# Patient Record
Sex: MALE | Race: WHITE | NOT HISPANIC OR LATINO | Employment: OTHER | URBAN - NONMETROPOLITAN AREA
[De-identification: names, ages, dates, MRNs, and addresses within clinical notes are randomized per-mention and may not be internally consistent; named-entity substitution may affect disease eponyms.]

---

## 2023-09-12 ENCOUNTER — OFFICE VISIT (OUTPATIENT)
Dept: URGENT CARE | Facility: CLINIC | Age: 23
End: 2023-09-12

## 2023-09-12 ENCOUNTER — APPOINTMENT (OUTPATIENT)
Dept: RADIOLOGY | Facility: CLINIC | Age: 23
End: 2023-09-12

## 2023-09-12 VITALS
DIASTOLIC BLOOD PRESSURE: 60 MMHG | TEMPERATURE: 97.8 F | HEART RATE: 78 BPM | RESPIRATION RATE: 16 BRPM | OXYGEN SATURATION: 98 % | SYSTOLIC BLOOD PRESSURE: 120 MMHG

## 2023-09-12 DIAGNOSIS — S93.401A SPRAIN OF RIGHT ANKLE, UNSPECIFIED LIGAMENT, INITIAL ENCOUNTER: ICD-10-CM

## 2023-09-12 DIAGNOSIS — M79.671 RIGHT FOOT PAIN: Primary | ICD-10-CM

## 2023-09-12 DIAGNOSIS — M79.671 RIGHT FOOT PAIN: ICD-10-CM

## 2023-09-12 PROCEDURE — G0382 LEV 3 HOSP TYPE B ED VISIT: HCPCS

## 2023-09-12 PROCEDURE — 73630 X-RAY EXAM OF FOOT: CPT

## 2023-09-12 PROCEDURE — 73610 X-RAY EXAM OF ANKLE: CPT

## 2023-09-12 NOTE — PATIENT INSTRUCTIONS
You may take over the counter Tylenol (Acetaminophen) and/or Motrin (Ibuprofen) as needed, as directed on packaging. Be sure to get plenty of rest, and drinking fluids to remain hydrated. If you continue to have symptoms please follow-up with your primary care provider and orthopedics as necessary. Your xray was read by the provider you saw today in the office as a preliminary result. Your xray will be reviewed and an official reading will be provided by a Radiologist. If you have access to My Chart you can see these results there. Also if there is any significant findings you will be contacted with those results.

## 2023-09-12 NOTE — PROGRESS NOTES
[facility]        NAME: Sergio Nguyen is a 23 y.o. male  : 2000    MRN: [id number]  DATE: 2023  TIME: 8:00 PM    Assessment and Plan   Right foot pain [M79.671]  1. Right foot pain  XR foot 3+ vw right    XR ankle 3+ vw right      2. Sprain of right ankle, unspecified ligament, initial encounter              Patient Instructions       Follow up with PCP in 3-5 days. Proceed to  ER if symptoms worsen. Chief Complaint     Chief Complaint   Patient presents with   • Foot Pain   • Ankle Pain     Started 7 days ago  Right foot caught on piece of wood under deck  Right foot, and ankle pain  Pain 10/10         History of Present Illness       Times approximately 1 week ago patient was standing on a 2 x 4 on the deck and got his foot caught on a piece of wood under the deck. He has right foot and ankle discomfort. He has been taking ibuprofen as needed. He has continued to work but states he has difficulty weightbearing on the right heel has been walking around toe walking. He does wear boots. No significant swelling no overlying skin changes noted      Review of Systems   Review of Systems   Constitutional: Negative for chills, fatigue and fever. Respiratory: Negative for cough and shortness of breath. Cardiovascular: Negative for chest pain and palpitations. Musculoskeletal: Positive for arthralgias and gait problem. Negative for joint swelling and myalgias. Skin: Negative for color change, pallor, rash and wound. Neurological: Negative for dizziness, light-headedness and headaches. Current Medications     No current outpatient medications on file.     Current Allergies     Allergies as of 2023 - Reviewed 2023   Allergen Reaction Noted   • Lactose intolerance (gi) - food allergy Diarrhea 2015   • Morphine and related Other (See Comments) 2010   • Omeprazole Other (See Comments) 2012            The following portions of the patient's history were reviewed and updated as appropriate: allergies, current medications, past family history, past medical history, past social history, past surgical history and problem list.     History reviewed. No pertinent past medical history. History reviewed. No pertinent surgical history. History reviewed. No pertinent family history. Medications have been verified. Objective   /60   Pulse 78   Temp 97.8 °F (36.6 °C)   Resp 16   SpO2 98%        Physical Exam     Physical Exam  Vitals and nursing note reviewed. Constitutional:       General: He is not in acute distress. Appearance: Normal appearance. He is normal weight. He is not ill-appearing. HENT:      Head: Normocephalic and atraumatic. Cardiovascular:      Rate and Rhythm: Normal rate. Pulses: Normal pulses. Dorsalis pedis pulses are 2+ on the right side. Pulmonary:      Effort: Pulmonary effort is normal.   Musculoskeletal:         General: Tenderness and signs of injury present. No swelling or deformity. Normal range of motion. Cervical back: Normal range of motion and neck supple. Right ankle: Tenderness present over the lateral malleolus and medial malleolus. Normal range of motion. Right Achilles Tendon: Tenderness present. Right foot: Normal range of motion and normal capillary refill. Tenderness present. No swelling, foot drop, prominent metatarsal heads or laceration. Normal pulse. Left foot: Normal.   Feet:      Right foot:      Skin integrity: Skin integrity normal. No skin breakdown, erythema, warmth or callus. Comments: Provider Radiology Interpretation (preliminary)   Final results will be as per official Radiology Report when available:   No acute fracture or dislocation noted  Skin:     General: Skin is warm and dry. Capillary Refill: Capillary refill takes less than 2 seconds. Neurological:      General: No focal deficit present.       Mental Status: He is alert and oriented to person, place, and time.    Psychiatric:         Mood and Affect: Mood normal.         Behavior: Behavior normal.

## 2025-03-27 ENCOUNTER — OFFICE VISIT (OUTPATIENT)
Dept: URGENT CARE | Facility: CLINIC | Age: 25
End: 2025-03-27

## 2025-03-27 VITALS
OXYGEN SATURATION: 98 % | RESPIRATION RATE: 18 BRPM | HEART RATE: 55 BPM | TEMPERATURE: 98.7 F | DIASTOLIC BLOOD PRESSURE: 80 MMHG | SYSTOLIC BLOOD PRESSURE: 132 MMHG

## 2025-03-27 DIAGNOSIS — Z23 ENCOUNTER FOR IMMUNIZATION: ICD-10-CM

## 2025-03-27 DIAGNOSIS — S61.412A LACERATION OF SKIN OF LEFT HAND, INITIAL ENCOUNTER: Primary | ICD-10-CM

## 2025-03-27 PROCEDURE — 90715 TDAP VACCINE 7 YRS/> IM: CPT

## 2025-03-27 PROCEDURE — 90471 IMMUNIZATION ADMIN: CPT

## 2025-03-27 PROCEDURE — 99213 OFFICE O/P EST LOW 20 MIN: CPT

## 2025-03-27 RX ORDER — LIDOCAINE HYDROCHLORIDE 10 MG/ML
5 INJECTION, SOLUTION EPIDURAL; INFILTRATION; INTRACAUDAL; PERINEURAL ONCE
Status: COMPLETED | OUTPATIENT
Start: 2025-03-27 | End: 2025-03-27

## 2025-03-27 RX ADMIN — LIDOCAINE HYDROCHLORIDE 5 ML: 10 INJECTION, SOLUTION EPIDURAL; INFILTRATION; INTRACAUDAL; PERINEURAL at 19:24

## 2025-03-27 NOTE — PATIENT INSTRUCTIONS
Watch the wound for increased redness, swelling, warmth, drainage or pain. If this occurs you will need to be rechecked     Motrin and tylenol for pain according to package directions    Keep the wound clean and dry.     Suture removal in 10-14 days from placement (4/6-4/10).   
Statement Selected

## 2025-03-27 NOTE — PROGRESS NOTES
"  [facility]        NAME: Sandeep Zamora is a 24 y.o. male  : 2000    MRN: [id number]  DATE: 2025  TIME: 7:16 PM    Assessment and Plan   Laceration of skin of left hand, initial encounter [S61.412A]  1. Laceration of skin of left hand, initial encounter  Tdap Vaccine greater than or equal to 8yo    lidocaine (PF) (XYLOCAINE-MPF) 1 % injection 5 mL      2. Encounter for immunization  Tdap Vaccine greater than or equal to 8yo        Laceration to the left hand when using a power tool that kicked back around 1:30 PM today. Pt was working and rinsed it out \"real good\" and placed liquid band aid over the wound.  Unknown last tetanus immunization.     Patient Instructions       Follow up with PCP in 3-5 days.  Proceed to  ER if symptoms worsen.    If tests are performed, our office will contact you with results only if changes need to made to the care plan discussed with you at the visit. [facility]    Chief Complaint     Chief Complaint   Patient presents with    Hand Laceration     Cut hand on tool at work at 130 today.  Washed with soap and water.  Did apply liquid bandage.  Will not stop bleeding         History of Present Illness       He was working with a power tool earlier today which kicked back and sliced his left hand.  He washed the wound out real good and placed liquid Band-Aid over the area.  He reported to this facility as the area will not stop bleeding.  Last tetanus .      Review of Systems   Review of Systems   Skin:  Positive for wound.         Current Medications     No current outpatient medications on file.    Current Facility-Administered Medications:     lidocaine (PF) (XYLOCAINE-MPF) 1 % injection 5 mL, 5 mL, Infiltration, Once,     Current Allergies     Allergies as of 2025 - Reviewed 2025   Allergen Reaction Noted    Lactose intolerance (gi) - food allergy Diarrhea 2015    Morphine and codeine " Other (See Comments) 09/22/2010    Omeprazole Other (See Comments) 07/09/2012            The following portions of the patient's history were reviewed and updated as appropriate: allergies, current medications, past family history, past medical history, past social history, past surgical history and problem list.     History reviewed. No pertinent past medical history.    History reviewed. No pertinent surgical history.    History reviewed. No pertinent family history.      Medications have been verified.        Objective   Pulse 55   Temp 98.7 °F (37.1 °C) (Skin)   Resp 18   SpO2 98%        Physical Exam     Physical Exam  Vitals and nursing note reviewed.   Constitutional:       General: He is not in acute distress.     Appearance: Normal appearance. He is normal weight. He is not ill-appearing.   HENT:      Head: Normocephalic and atraumatic.   Cardiovascular:      Rate and Rhythm: Regular rhythm. Bradycardia present.      Pulses: Normal pulses.      Heart sounds: Normal heart sounds.   Pulmonary:      Effort: Pulmonary effort is normal.      Breath sounds: Normal breath sounds.   Musculoskeletal:         General: Signs of injury present.        Hands:       Cervical back: Normal range of motion and neck supple.      Comments: Small laceration (superficial), 0.5cm, linear. Minimal bleeding controlled with manual pressure. Cap refill less than 2 secs. Sensation intact. Distal pulses palpable. ROM and strength WNL.   Skin:     General: Skin is warm and dry.      Capillary Refill: Capillary refill takes less than 2 seconds.   Neurological:      General: No focal deficit present.      Mental Status: He is alert and oriented to person, place, and time.

## 2025-05-26 ENCOUNTER — OFFICE VISIT (OUTPATIENT)
Dept: URGENT CARE | Facility: CLINIC | Age: 25
End: 2025-05-26
Payer: COMMERCIAL

## 2025-05-26 ENCOUNTER — APPOINTMENT (OUTPATIENT)
Dept: RADIOLOGY | Facility: CLINIC | Age: 25
End: 2025-05-26
Attending: PHYSICIAN ASSISTANT
Payer: COMMERCIAL

## 2025-05-26 VITALS
HEART RATE: 80 BPM | DIASTOLIC BLOOD PRESSURE: 98 MMHG | OXYGEN SATURATION: 98 % | SYSTOLIC BLOOD PRESSURE: 151 MMHG | TEMPERATURE: 98 F | RESPIRATION RATE: 18 BRPM

## 2025-05-26 DIAGNOSIS — M79.641 HAND PAIN, RIGHT: ICD-10-CM

## 2025-05-26 DIAGNOSIS — S62.314A CLOSED DISPLACED FRACTURE OF BASE OF FOURTH METACARPAL BONE OF RIGHT HAND, INITIAL ENCOUNTER: Primary | ICD-10-CM

## 2025-05-26 PROCEDURE — 73130 X-RAY EXAM OF HAND: CPT

## 2025-05-26 PROCEDURE — 99214 OFFICE O/P EST MOD 30 MIN: CPT | Performed by: PHYSICIAN ASSISTANT

## 2025-05-26 RX ORDER — IBUPROFEN 800 MG/1
800 TABLET, FILM COATED ORAL EVERY 6 HOURS PRN
Qty: 30 TABLET | Refills: 0 | Status: SHIPPED | OUTPATIENT
Start: 2025-05-26

## 2025-05-26 NOTE — PATIENT INSTRUCTIONS
Your xray was read by the provider you saw today in the office as a preliminary result. Your xray will be reviewed and an official reading will be provided by a Radiologist. If you have access to My Chart you can see these results there. Also if there is any significant findings you will be contacted with those results.    You have been placed in a splint today because you have a fracture or suspected fracture. It is important to leave you splint in place until you follow-up with an orthopedic specialist. The splint helps protect your fracture and keep it from moving and prevents you from developing nerve and vessel damage.   Follow-up with ortho sometime in the next week.   Take 600 to 800 mg of ibuprofen every 8 hours.  Supplement with Tylenol 1000 mg every 8 hours as needed, alternating every 4 hours with ibuprofen.  Ice 20 minutes on 20 minutes off.  Elevate above the level of the heart whenever not in use.  If you develop numbness or tingling in your fingers or have increased swelling or pain report to the closest emergency room, loosen the wrap around your splint. If symptoms do not improve in the next 20-30 minutes after loosening the wrap, report to the emergency department.       Follow up with PCP in 3-5 days.  Proceed to  ER if symptoms worsen.    If tests have been performed at [facility], our office will contact you with results if changes need to be made to the care plan discussed with you at the visit.  You can review your full results on [facility] MyChart.

## 2025-05-26 NOTE — PROGRESS NOTES
[facility]        NAME: Sandeep Zamora is a 24 y.o. male  : 2000    MRN: [id number]  DATE: May 26, 2025  TIME: 3:28 PM    Assessment and Plan   Closed displaced fracture of base of fourth metacarpal bone of right hand, initial encounter [S62.314A]  1. Closed displaced fracture of base of fourth metacarpal bone of right hand, initial encounter  Ambulatory Referral to Orthopedic Surgery    ibuprofen (MOTRIN) 800 mg tablet      2. Hand pain, right  XR hand 3+ vw right      X-ray SHEMAR initial interpretation:Closed displaced fracture of base of fourth metacarpal bone of right hand.  The patient was splinted using Ortho-Glass in an ulnar gutter configuration for immobilization.  A referral to orthopedics was provided for further evaluation and management.  The patient was instructed to contact the orthopedic clinic to schedule an appointment.  As patient ask for the final results review.  I did mention one of the provider who works tomorrow will call with the results.    Patient Instructions   Your xray was read by the provider you saw today in the office as a preliminary result. Your xray will be reviewed and an official reading will be provided by a Radiologist. If you have access to My Chart you can see these results there. Also if there is any significant findings you will be contacted with those results.    You have been placed in a splint today because you have a fracture or suspected fracture. It is important to leave you splint in place until you follow-up with an orthopedic specialist. The splint helps protect your fracture and keep it from moving and prevents you from developing nerve and vessel damage.   Follow-up with ortho sometime in the next week.   Take 600 to 800 mg of ibuprofen every 8 hours.  Supplement with Tylenol 1000 mg every 8 hours as needed, alternating every 4 hours with ibuprofen.  Ice 20 minutes on 20 minutes off.  Elevate above the level of the heart whenever not in  use.  If you develop numbness or tingling in your fingers or have increased swelling or pain report to the closest emergency room, loosen the wrap around your splint. If symptoms do not improve in the next 20-30 minutes after loosening the wrap, report to the emergency department.       Follow up with PCP in 3-5 days.  Proceed to  ER if symptoms worsen.    If tests have been performed at [facility], our office will contact you with results if changes need to be made to the care plan discussed with you at the visit.  You can review your full results on [facility].    Chief Complaint     Chief Complaint   Patient presents with    Pain     Pain over right 4th 5th  punched a wall last night swelling noted with abrasion to 4th knuckle denies other injuries last td 1 month ago          History of Present Illness       Injury  The incident occurred 12 to 24 hours ago. The incident occurred at home. The injury mechanism was a direct blow (punched against the wall). No protective equipment was used. Finger injury location: 4 th and 5 th finger. The pain is moderate. It is unlikely that a foreign body is present. Pertinent negatives include no headaches, inability to bear weight, light-headedness, nausea, numbness, seizures, tingling, visual disturbance, vomiting or weakness. There have been no prior injuries to these areas. His tetanus status is UTD.       Review of Systems   Review of Systems   Constitutional:  Negative for activity change and fatigue.   HENT:  Negative for ear discharge and rhinorrhea.    Eyes:  Negative for photophobia and visual disturbance.   Gastrointestinal:  Negative for diarrhea, nausea and vomiting.   Musculoskeletal:  Positive for joint swelling. Negative for myalgias.   Skin:  Positive for color change and wound. Negative for rash.   Neurological:  Negative for dizziness, tingling, seizures, syncope, facial asymmetry, speech difficulty, weakness, light-headedness, numbness and headaches.    Psychiatric/Behavioral:  Negative for confusion.          Current Medications     Current Medications[1]    Current Allergies     Allergies as of 05/26/2025 - Reviewed 05/26/2025   Allergen Reaction Noted    Lactose intolerance (gi) - food allergy Diarrhea 06/18/2015    Morphine and codeine Other (See Comments) 09/22/2010    Omeprazole Other (See Comments) 07/09/2012            The following portions of the patient's history were reviewed and updated as appropriate: allergies, current medications, past family history, past medical history, past social history, past surgical history and problem list.     Past Medical History[2]    Past Surgical History[3]    Family History[4]      Medications have been verified.        Objective   /98   Pulse 80   Temp 98 °F (36.7 °C)   Resp 18   SpO2 98%   No LMP for male patient.       Physical Exam     Physical Exam  Vitals and nursing note reviewed.   Constitutional:       Appearance: Normal appearance.     Cardiovascular:      Rate and Rhythm: Normal rate and regular rhythm.      Pulses: Normal pulses.      Heart sounds: Normal heart sounds.   Pulmonary:      Effort: Pulmonary effort is normal.      Breath sounds: Normal breath sounds.   Abdominal:      General: Abdomen is flat.      Palpations: Abdomen is soft.     Musculoskeletal:         General: Tenderness present. No swelling, deformity or signs of injury.      Right hand: No tenderness or bony tenderness. Normal range of motion. Normal capillary refill. Normal pulse.      Left hand: Swelling and tenderness present. Decreased range of motion.      Right lower leg: No edema.      Left lower leg: No edema.      Right ankle: No swelling, deformity or ecchymosis. No tenderness. Normal range of motion.      Right Achilles Tendon: Normal.      Left ankle: No swelling, deformity or ecchymosis. No tenderness. Normal range of motion.      Left Achilles Tendon: Normal.     Skin:     General: Skin is warm.      Capillary  Refill: Capillary refill takes less than 2 seconds.      Findings: Abrasion and bruising present. No erythema.     Neurological:      Mental Status: He is alert.           Orthopedic injury treatment    Date/Time: 5/26/2025 11:15 AM    Performed by: Lilibeth Caldwell PA-C  Authorized by: Lilibeth Caldwell PA-C    Patient Location:  [facility] Protocol:  procedure performed by consultantConsent: Verbal consent obtained  Consent given by: patient  Patient understanding: patient states understanding of the procedure being performed    Injury location:  Hand  Location details:  Right hand  Injury type:  Fracture  Fracture type: fourth metacarpal    Neurovascular status: Neurovascularly intact    Distal perfusion: normal    Neurological function: normal    Range of motion: normal    Local anesthesia used?: No    General anesthesia used?: No    Immobilization:  Splint  Splint type:  Ulnar gutter  Cast type:  Short arm  Supplies used:  Ortho-Glass  Neurovascular status: Neurovascularly intact    Distal perfusion: normal    Neurological function: normal    Range of motion: normal    Patient tolerance:  Patient tolerated the procedure well with no immediate complications                   [1]   Current Outpatient Medications:     ibuprofen (MOTRIN) 800 mg tablet, Take 1 tablet (800 mg total) by mouth every 6 (six) hours as needed for mild pain, Disp: 30 tablet, Rfl: 0  [2] No past medical history on file.  [3] No past surgical history on file.  [4] No family history on file.

## 2025-05-27 ENCOUNTER — RESULTS FOLLOW-UP (OUTPATIENT)
Dept: URGENT CARE | Facility: CLINIC | Age: 25
End: 2025-05-27

## 2025-06-04 ENCOUNTER — APPOINTMENT (OUTPATIENT)
Dept: RADIOLOGY | Facility: MEDICAL CENTER | Age: 25
End: 2025-06-04
Attending: STUDENT IN AN ORGANIZED HEALTH CARE EDUCATION/TRAINING PROGRAM

## 2025-06-04 VITALS
RESPIRATION RATE: 16 BRPM | HEART RATE: 50 BPM | WEIGHT: 185 LBS | OXYGEN SATURATION: 98 % | BODY MASS INDEX: 25.06 KG/M2 | TEMPERATURE: 98.5 F | HEIGHT: 72 IN

## 2025-06-04 DIAGNOSIS — S60.221A CONTUSION OF RIGHT HAND, INITIAL ENCOUNTER: Primary | ICD-10-CM

## 2025-06-04 DIAGNOSIS — M79.641 RIGHT HAND PAIN: ICD-10-CM

## 2025-06-04 PROCEDURE — 73130 X-RAY EXAM OF HAND: CPT

## 2025-06-04 PROCEDURE — 99203 OFFICE O/P NEW LOW 30 MIN: CPT | Performed by: STUDENT IN AN ORGANIZED HEALTH CARE EDUCATION/TRAINING PROGRAM

## 2025-06-04 NOTE — ASSESSMENT & PLAN NOTE
The patient is a 24-year-old male with a right hand contusion as well as a laceration over the dorsal aspect of the ring finger MCP joint.  There is a questionable cortical avulsion off the base of the fourth metacarpal but this appears to be chronic.  On examination the patient has some global tenderness on the dorsal aspect of the hand with some tenderness about the fovea but overall the hand appears stable.  He is able to make a full composite fist with full wrist extension.  Additionally his tenderness is relatively mild.  Repeat imaging today did not demonstrate any acute fracture or dislocation.  I discussed with him that I would attribute this largely to a contusion of the hand.    I expect this injury to improve over time. He may gradually return to his normal activities as tolerated. He should use pain as his guide. If in 4 weeks, his symptoms are unresolved or have worsened, he should be seen in the office for re-evaluation. If he continues to be symptomatic, an MRI may be considered.    The patient verbalized understanding of exam findings and treatment plan. We engaged in the shared decision-making process and treatment options were discussed at length with the patient. Surgical and conservative management discussed today along with risks and benefits.    Plan:   I had a discussion with the patient regarding my clinical findings, diagnosis, and treatment plan.  All questions answered.    OTC NSAIDs/tylenol for pain management  Return if symptoms worsen or fail to improve.    Orders:  •  XR hand 3+ vw right; Future

## 2025-06-04 NOTE — PROGRESS NOTES
ASSESSMENT/PLAN:    Assessment & Plan  Contusion of right hand, initial encounter  The patient is a 24-year-old male with a right hand contusion as well as a laceration over the dorsal aspect of the ring finger MCP joint.  There is a questionable cortical avulsion off the base of the fourth metacarpal but this appears to be chronic.  On examination the patient has some global tenderness on the dorsal aspect of the hand with some tenderness about the fovea but overall the hand appears stable.  He is able to make a full composite fist with full wrist extension.  Additionally his tenderness is relatively mild.  Repeat imaging today did not demonstrate any acute fracture or dislocation.  I discussed with him that I would attribute this largely to a contusion of the hand.    I expect this injury to improve over time. He may gradually return to his normal activities as tolerated. He should use pain as his guide. If in 4 weeks, his symptoms are unresolved or have worsened, he should be seen in the office for re-evaluation. If he continues to be symptomatic, an MRI may be considered.    The patient verbalized understanding of exam findings and treatment plan. We engaged in the shared decision-making process and treatment options were discussed at length with the patient. Surgical and conservative management discussed today along with risks and benefits.    Plan:   I had a discussion with the patient regarding my clinical findings, diagnosis, and treatment plan.  All questions answered.    OTC NSAIDs/tylenol for pain management  Return if symptoms worsen or fail to improve.    Orders:    XR hand 3+ vw right; Future      _____________________________________________________  CHIEF COMPLAINT:  Right hand pain      SUBJECTIVE:  Sandeep Zamora is a 24 y.o. right hand dominant male presenting for evaluation of right hand. The patient states the injury occurred while he punched a wall. After injury he was initially evaluated by  "[facility] [place].  They were placed in a splint and referred for follow-up.  Since that time their pain has been moderately well-controlled.  They do not have numbness or tingling in the hand including no numbness or tingling in the median nerve distribution. He does complain of some stiffness discomfort.  There is no pain in the elbow or shoulder.  Here to establish care.    DOI: 5/25/2025    Occupation/hobbies: Construction      PAST MEDICAL HISTORY:  Past Medical History[1]    PAST SURGICAL HISTORY:  Past Surgical History[2]    FAMILY HISTORY:  Family History[3]    SOCIAL HISTORY:  Social History[4]    MEDICATIONS:  Current Medications[5]    ALLERGIES:  Allergies[6]    REVIEW OF SYSTEMS:  Pertinent items are noted in HPI.  A comprehensive review of systems was negative.    LABS:  HgA1c: No results found for: \"HGBA1C\"  BMP: No results found for: \"GLUCOSE\", \"CALCIUM\", \"NA\", \"K\", \"CO2\", \"CL\", \"BUN\", \"CREATININE\"      _____________________________________________________  PHYSICAL EXAMINATION:  Vital signs: Pulse (!) 50   Temp 98.5 °F (36.9 °C) (Temporal)   Resp 16   Ht 6' (1.829 m)   Wt 83.9 kg (185 lb)   SpO2 98%   BMI 25.09 kg/m²   General: well developed and well nourished, alert, oriented times 3, and appears comfortable  Psychiatric: Normal  HEENT: Trachea Midline, No torticollis  Cardiovascular: No discernable arrhythmia  Pulmonary: No wheezing or stridor  Abdomen: No rebound or guarding  Extremities: No peripheral edema  Skin: No masses, erythema, lacerations, fluctation, ulcerations  Neurovascular: Sensation Intact to the Median, Ulnar, Radial Nerve, Motor Intact to the Median, Ulnar, Radial Nerve, and Pulses Intact    MUSCULOSKELETAL EXAMINATION:  Right hand  Skin intact  There is a small abrasion over the dorsal aspect of the 4th finger metacarpal. It does not appear to infected. There is no erythema. He is able to fully extend all 5 digits and make a full composite fist.   No LT " compression tenderness  Mild tenderness at the base of the 4th metacarpal  CMC tenderness  Mild foveal tenderness  TTP:  Radial styloid: no   Scaphoid tubercle: yes   Anatomic snuff box: yes  Distal Ulna: no  Distal radius: no  Doral LT: mild  Doral SL: mild  DRUJ stable in pronation, neutral, and pronation.   Range of Motion:  Elbow: extension/flexion 0/140  Forearm: pronation/supination 80/80  Wrist: extension/flexion 70/70  Digit: full AROM in DIP, PIP, MP joints  Motor Exam: firing AIN/PIN/U  Sensory Exam: Sensation intact to light touch in FDWS (radial), volar IF (median), volar SF (ulnar)  Vascular Exam: < 2 sec capillary refill     _____________________________________________________  STUDIES REVIEWED:  I reviewed imaging in [facility] from 5/26/2025 of the right hand which demonstrates small ossific density lateral base of the fourth metacarpal which appears well corticated and chronic. No evidence of acute fracture or dislocation.        Scribe Attestation      I,:  Michelle Pro am acting as a scribe while in the presence of the attending physician.:       I,:  Gera Vann MD personally performed the services described in this documentation    as scribed in my presence.:                  [1] No past medical history on file.  [2] No past surgical history on file.  [3] No family history on file.  [4]   Social History  Tobacco Use    Smoking status: Never    Smokeless tobacco: Never   Vaping Use    Vaping status: Never Used   Substance Use Topics    Alcohol use: Never    Drug use: Never   [5]   Current Outpatient Medications:     ibuprofen (MOTRIN) 800 mg tablet, Take 1 tablet (800 mg total) by mouth every 6 (six) hours as needed for mild pain, Disp: 30 tablet, Rfl: 0    Lactase 4500 units TABS, Take by mouth, Disp: , Rfl:     LACTASE PO, Take by mouth, Disp: , Rfl:   [6]   Allergies  Allergen Reactions    Lactose Intolerance (Gi) - Food Allergy Diarrhea    Milk (Cow) Diarrhea    Morphine And Codeine Other  (See Comments)     Adverse reaction--hyper    Omeprazole Other (See Comments)     Other reaction(s): Hypotension  Dizziness, and light headiness  Dizziness, and light headiness      Other Hyperactivity     Adverse reaction--hyper